# Patient Record
Sex: FEMALE | Race: WHITE | NOT HISPANIC OR LATINO | URBAN - METROPOLITAN AREA
[De-identification: names, ages, dates, MRNs, and addresses within clinical notes are randomized per-mention and may not be internally consistent; named-entity substitution may affect disease eponyms.]

---

## 2019-02-25 ENCOUNTER — EMERGENCY (EMERGENCY)
Facility: HOSPITAL | Age: 34
LOS: 1 days | Discharge: HOME | End: 2019-02-25
Attending: EMERGENCY MEDICINE
Payer: COMMERCIAL

## 2019-02-25 VITALS
HEART RATE: 66 BPM | HEIGHT: 65 IN | TEMPERATURE: 98 F | RESPIRATION RATE: 16 BRPM | WEIGHT: 149.91 LBS | DIASTOLIC BLOOD PRESSURE: 81 MMHG | SYSTOLIC BLOOD PRESSURE: 128 MMHG | OXYGEN SATURATION: 98 %

## 2019-02-25 PROCEDURE — 99284 EMERGENCY DEPT VISIT MOD MDM: CPT

## 2019-02-25 RX ORDER — KETOROLAC TROMETHAMINE 30 MG/ML
60 SYRINGE (ML) INJECTION ONCE
Qty: 0 | Refills: 0 | Status: DISCONTINUED | OUTPATIENT
Start: 2019-02-25 | End: 2019-02-25

## 2019-02-25 RX ORDER — OXYCODONE AND ACETAMINOPHEN 5; 325 MG/1; MG/1
1 TABLET ORAL ONCE
Qty: 0 | Refills: 0 | Status: DISCONTINUED | OUTPATIENT
Start: 2019-02-25 | End: 2019-02-25

## 2019-02-25 RX ORDER — ONDANSETRON 8 MG/1
4 TABLET, FILM COATED ORAL ONCE
Qty: 0 | Refills: 0 | Status: COMPLETED | OUTPATIENT
Start: 2019-02-25 | End: 2019-02-25

## 2019-02-25 RX ORDER — ONDANSETRON 8 MG/1
1 TABLET, FILM COATED ORAL
Qty: 18 | Refills: 0
Start: 2019-02-25 | End: 2019-03-02

## 2019-02-25 NOTE — ED PROVIDER NOTE - PRINCIPAL DIAGNOSIS
Non-recurrent acute suppurative otitis media of right ear without spontaneous rupture of tympanic membrane

## 2019-02-25 NOTE — ED PROVIDER NOTE - CARE PROVIDER_API CALL
Fermin José)  Otolaryngology  [address] [place] [address], [address]  [place] [address]  Phone: [phone]  Fax: [phone]  Follow Up Time:

## 2019-02-25 NOTE — ED PROVIDER NOTE - CARE PLAN
Principal Discharge DX:	Non-recurrent acute suppurative otitis media of right ear without spontaneous rupture of tympanic membrane

## 2019-02-25 NOTE — ED PROVIDER NOTE - NSFOLLOWUPINSTRUCTIONS_ED_ALL_ED_FT
Call the ENT for a follow up appointment ASAP. Ask about the possibility of myringotomy tube to help relieve the pressure.

## 2019-02-25 NOTE — ED PROVIDER NOTE - OBJECTIVE STATEMENT
Pertinent PMH/PSH/FHx/SHx and Review of Systems contained within:  33f no med hx pw right ear pain. patient notes her right ear did not "pop" when she descended from a flight today. it feels to her like it is going to "burst" and she notes decreased hearing. there is no nausea, vomiting, fever, chills, dizziness, ha, eye pain. she took tylenol and ibuprofen without relief  Fh and Sh not otherwise contributory  ROS otherwise negative

## 2019-02-26 PROCEDURE — 99284 EMERGENCY DEPT VISIT MOD MDM: CPT | Mod: 25

## 2019-02-26 PROCEDURE — 96372 THER/PROPH/DIAG INJ SC/IM: CPT

## 2019-02-26 RX ADMIN — OXYCODONE AND ACETAMINOPHEN 1 TABLET(S): 5; 325 TABLET ORAL at 00:06

## 2019-02-26 RX ADMIN — ONDANSETRON 4 MILLIGRAM(S): 8 TABLET, FILM COATED ORAL at 00:10

## 2019-02-26 RX ADMIN — Medication 60 MILLIGRAM(S): at 00:06

## 2019-02-26 RX ADMIN — Medication 1 TABLET(S): at 00:06

## 2019-02-26 RX ADMIN — OXYCODONE AND ACETAMINOPHEN 1 TABLET(S): 5; 325 TABLET ORAL at 00:12

## 2019-02-26 RX ADMIN — Medication 60 MILLIGRAM(S): at 00:12

## 2019-03-04 NOTE — ED PROVIDER NOTE - CLINICAL SUMMARY MEDICAL DECISION MAKING FREE TEXT BOX
patient pw OM and ear effusion causing failure of pressure equilibration upon descent from flight. Will start on abx and analgesic. Have recommended myringotomy tubes for patient at outpatient ent. no

## 2021-05-02 ENCOUNTER — EMERGENCY (EMERGENCY)
Facility: HOSPITAL | Age: 36
LOS: 1 days | Discharge: HOME | End: 2021-05-02
Attending: STUDENT IN AN ORGANIZED HEALTH CARE EDUCATION/TRAINING PROGRAM
Payer: COMMERCIAL

## 2021-05-02 VITALS
SYSTOLIC BLOOD PRESSURE: 127 MMHG | RESPIRATION RATE: 17 BRPM | TEMPERATURE: 99 F | WEIGHT: 149.91 LBS | HEIGHT: 65 IN | HEART RATE: 63 BPM | OXYGEN SATURATION: 100 % | DIASTOLIC BLOOD PRESSURE: 80 MMHG

## 2021-05-02 PROCEDURE — 12001 RPR S/N/AX/GEN/TRNK 2.5CM/<: CPT

## 2021-05-02 PROCEDURE — 99284 EMERGENCY DEPT VISIT MOD MDM: CPT | Mod: 25

## 2021-05-02 PROCEDURE — 99283 EMERGENCY DEPT VISIT LOW MDM: CPT | Mod: 25

## 2021-05-02 PROCEDURE — 73140 X-RAY EXAM OF FINGER(S): CPT

## 2021-05-02 PROCEDURE — 73140 X-RAY EXAM OF FINGER(S): CPT | Mod: 26,LT

## 2021-05-02 RX ORDER — CEPHALEXIN 500 MG
1 CAPSULE ORAL
Qty: 21 | Refills: 0
Start: 2021-05-02 | End: 2021-05-08

## 2021-05-02 NOTE — ED PROVIDER NOTE - ATTENDING CONTRIBUTION TO CARE
I have seen and examined the patient at bedside. I agree with the NP's plan.   Accidental laceration of left 3rd digit at level of DIP and dorsal aspect, with home knife. No nail bed or bony involvement. Superficial and clean. Neuro intact although tuft of digit not fully extending. Most likely due to swelling with no signs of extensor tendon involvement, as rest of digit extends fully. Will repair and place on hyperextended splint and hand follow up. Tetanus UTD.

## 2021-05-02 NOTE — ED PROVIDER NOTE - OBJECTIVE STATEMENT
35 y.o female with no PMhx and no PSHx presents to the ED c.o finger laceration , earlier today was cutting sweet potatoes when the knife slipped. Patient endorses minimal localized pain. Patient endorses tetanus is UTD. Patient denies any numbness tingling, or any other acute complaints. NKDA

## 2021-05-02 NOTE — ED PROCEDURE NOTE - CPROC ED POST PROC CARE GUIDE1
Verbal/written post procedure instructions were given to patient/caregiver./Instructed patient/caregiver to follow-up with primary care physician./Instructed patient/caregiver regarding signs and symptoms of infection./Keep the cast/splint/dressing clean and dry.
Verbal/written post procedure instructions were given to patient/caregiver./Instructed patient/caregiver to follow-up with primary care physician./Instructed patient/caregiver regarding signs and symptoms of infection./Elevate the injured extremity as instructed./Keep the cast/splint/dressing clean and dry.

## 2021-05-02 NOTE — ED PROVIDER NOTE - CARE PROVIDER_API CALL
Pretty John)  Plastic Surgery; Surgery  [address] [place] [address]  Phone: [phone]  Fax: [phone]  Follow Up Time:

## 2021-05-02 NOTE — ED PROVIDER NOTE - PHYSICAL EXAMINATION
L middle finger dorsal aspect of DIP with x1 cm laceration and finger passively flexd at DIP   Unable to extend at DIP, no sensory deficit   Cap refill <2 seconds

## 2021-05-02 NOTE — ED PROVIDER NOTE - NSFOLLOWUPINSTRUCTIONS_ED_ALL_ED_FT
Follow up with Dr Elias tomorrow. Call the office ([phone]) before 10 AM and explain that I spoke with Dr Elias and he wants to see you on Monday.    If you experience any new or worsening symptoms or if you are concerned you can always come back to the emergency for a re-evaluation.    For pain you can take over the counter Ibuprofen 600 mg orally every 6 hours as needed for pain. Take medication with food.

## 2021-05-02 NOTE — ED PROVIDER NOTE - PATIENT PORTAL LINK FT
You can access the FollowMyHealth Patient Portal offered by  by registering at the following website: [email or web address]. By joining [employer]’s FollowMyHealth portal, you will also be able to view your health information using other applications (apps) compatible with our system.

## 2021-05-02 NOTE — ED PROCEDURE NOTE - ATTENDING CONTRIBUTION TO CARE
Finger splint well placed on hyperextention position.
Laceration will repaired with boarders well approximated. No complications during procedure.

## 2021-05-02 NOTE — ED PROVIDER NOTE - CLINICAL SUMMARY MEDICAL DECISION MAKING FREE TEXT BOX
34 y/o female with L middle finger laceration today. Given location of laceration and physical exam, suspicion of possible extensive tendon injury, Will consult hand surgery and reassess

## 2021-05-02 NOTE — ED PROCEDURE NOTE - CPROC ED TIME OUT STATEMENT1
“Patient's name, , procedure and correct site were confirmed during the [place] Timeout.”
“Patient's name, , procedure and correct site were confirmed during the [place] Timeout.”

## 2021-05-02 NOTE — ED PROVIDER NOTE - PROGRESS NOTE DETAILS
XR negative. Suspicion of possible extensor tendon injury. Discussed with on-call hand Dr Elias. Surgeon will be able to see patient tomorrow in office. Will close lac, splint, Keflex prophylaxis. Pt is well appearing walking with steady gait, stable for discharge and follow up without fail with medical doctor. I had a detailed discussion with the patient and/or guardian regarding the historical points, exam findings, and any diagnostic results supporting the discharge diagnosis. Pt educated on care and need for follow up. Strict return instructions and red flag signs and symptoms discussed with patient. Questions answered. Pt shows understanding of discharge information and agrees to follow.

## 2021-05-06 ENCOUNTER — INPATIENT (INPATIENT)
Facility: HOSPITAL | Age: 36
LOS: 0 days | Discharge: HOME | DRG: 514 | End: 2021-05-06
Attending: SURGERY | Admitting: SURGERY
Payer: COMMERCIAL

## 2021-05-06 VITALS
SYSTOLIC BLOOD PRESSURE: 116 MMHG | DIASTOLIC BLOOD PRESSURE: 60 MMHG | RESPIRATION RATE: 16 BRPM | OXYGEN SATURATION: 100 % | HEART RATE: 63 BPM | TEMPERATURE: 98 F

## 2021-05-06 VITALS
SYSTOLIC BLOOD PRESSURE: 121 MMHG | HEIGHT: 65 IN | RESPIRATION RATE: 18 BRPM | DIASTOLIC BLOOD PRESSURE: 81 MMHG | HEART RATE: 72 BPM | TEMPERATURE: 98 F | WEIGHT: 149.69 LBS | OXYGEN SATURATION: 99 %

## 2021-05-06 DIAGNOSIS — S56.129S LACERATION OF FLEXOR MUSCLE, FASCIA AND TENDON OF UNSPECIFIED FINGER AT FOREARM LEVEL, SEQUELA: ICD-10-CM

## 2021-05-06 LAB
ALBUMIN SERPL ELPH-MCNC: 3.5 G/DL — SIGNIFICANT CHANGE UP (ref 3.5–5)
ALP SERPL-CCNC: 40 U/L — SIGNIFICANT CHANGE UP (ref 40–120)
ALT FLD-CCNC: 25 U/L DA — SIGNIFICANT CHANGE UP (ref 10–60)
ANION GAP SERPL CALC-SCNC: 7 MMOL/L — SIGNIFICANT CHANGE UP (ref 5–17)
APTT BLD: 27 SEC — LOW (ref 27.5–35.5)
AST SERPL-CCNC: 20 U/L — SIGNIFICANT CHANGE UP (ref 10–40)
BASOPHILS # BLD AUTO: 0.03 K/UL — SIGNIFICANT CHANGE UP (ref 0–0.2)
BASOPHILS NFR BLD AUTO: 0.6 % — SIGNIFICANT CHANGE UP (ref 0–2)
BILIRUB SERPL-MCNC: 0.3 MG/DL — SIGNIFICANT CHANGE UP (ref 0.2–1.2)
BLD GP AB SCN SERPL QL: SIGNIFICANT CHANGE UP
BUN SERPL-MCNC: 13 MG/DL — SIGNIFICANT CHANGE UP (ref 7–18)
CALCIUM SERPL-MCNC: 8.5 MG/DL — SIGNIFICANT CHANGE UP (ref 8.4–10.5)
CHLORIDE SERPL-SCNC: 107 MMOL/L — SIGNIFICANT CHANGE UP (ref 96–108)
CO2 SERPL-SCNC: 24 MMOL/L — SIGNIFICANT CHANGE UP (ref 22–31)
CREAT SERPL-MCNC: 0.68 MG/DL — SIGNIFICANT CHANGE UP (ref 0.5–1.3)
EOSINOPHIL # BLD AUTO: 0.04 K/UL — SIGNIFICANT CHANGE UP (ref 0–0.5)
EOSINOPHIL NFR BLD AUTO: 0.8 % — SIGNIFICANT CHANGE UP (ref 0–6)
GLUCOSE SERPL-MCNC: 92 MG/DL — SIGNIFICANT CHANGE UP (ref 70–99)
HCG SERPL-ACNC: <1 MIU/ML — SIGNIFICANT CHANGE UP
HCT VFR BLD CALC: 39.6 % — SIGNIFICANT CHANGE UP (ref 34.5–45)
HGB BLD-MCNC: 13.5 G/DL — SIGNIFICANT CHANGE UP (ref 11.5–15.5)
IMM GRANULOCYTES NFR BLD AUTO: 0.2 % — SIGNIFICANT CHANGE UP (ref 0–1.5)
INR BLD: 0.92 RATIO — SIGNIFICANT CHANGE UP (ref 0.88–1.16)
LYMPHOCYTES # BLD AUTO: 1.25 K/UL — SIGNIFICANT CHANGE UP (ref 1–3.3)
LYMPHOCYTES # BLD AUTO: 25.7 % — SIGNIFICANT CHANGE UP (ref 13–44)
MCHC RBC-ENTMCNC: 32.6 PG — SIGNIFICANT CHANGE UP (ref 27–34)
MCHC RBC-ENTMCNC: 34.1 GM/DL — SIGNIFICANT CHANGE UP (ref 32–36)
MCV RBC AUTO: 95.7 FL — SIGNIFICANT CHANGE UP (ref 80–100)
MONOCYTES # BLD AUTO: 0.35 K/UL — SIGNIFICANT CHANGE UP (ref 0–0.9)
MONOCYTES NFR BLD AUTO: 7.2 % — SIGNIFICANT CHANGE UP (ref 2–14)
NEUTROPHILS # BLD AUTO: 3.19 K/UL — SIGNIFICANT CHANGE UP (ref 1.8–7.4)
NEUTROPHILS NFR BLD AUTO: 65.5 % — SIGNIFICANT CHANGE UP (ref 43–77)
NRBC # BLD: 0 /100 WBCS — SIGNIFICANT CHANGE UP (ref 0–0)
PLATELET # BLD AUTO: 248 K/UL — SIGNIFICANT CHANGE UP (ref 150–400)
POTASSIUM SERPL-MCNC: 3.8 MMOL/L — SIGNIFICANT CHANGE UP (ref 3.5–5.3)
POTASSIUM SERPL-SCNC: 3.8 MMOL/L — SIGNIFICANT CHANGE UP (ref 3.5–5.3)
PROT SERPL-MCNC: 7 G/DL — SIGNIFICANT CHANGE UP (ref 6–8.3)
PROTHROM AB SERPL-ACNC: 11 SEC — SIGNIFICANT CHANGE UP (ref 10.6–13.6)
RBC # BLD: 4.14 M/UL — SIGNIFICANT CHANGE UP (ref 3.8–5.2)
RBC # FLD: 12.2 % — SIGNIFICANT CHANGE UP (ref 10.3–14.5)
SARS-COV-2 RNA SPEC QL NAA+PROBE: SIGNIFICANT CHANGE UP
SODIUM SERPL-SCNC: 138 MMOL/L — SIGNIFICANT CHANGE UP (ref 135–145)
WBC # BLD: 4.87 K/UL — SIGNIFICANT CHANGE UP (ref 3.8–10.5)
WBC # FLD AUTO: 4.87 K/UL — SIGNIFICANT CHANGE UP (ref 3.8–10.5)

## 2021-05-06 PROCEDURE — 86901 BLOOD TYPING SEROLOGIC RH(D): CPT

## 2021-05-06 PROCEDURE — 99291 CRITICAL CARE FIRST HOUR: CPT

## 2021-05-06 PROCEDURE — 99285 EMERGENCY DEPT VISIT HI MDM: CPT

## 2021-05-06 PROCEDURE — 87635 SARS-COV-2 COVID-19 AMP PRB: CPT

## 2021-05-06 PROCEDURE — 36415 COLL VENOUS BLD VENIPUNCTURE: CPT

## 2021-05-06 PROCEDURE — 85610 PROTHROMBIN TIME: CPT

## 2021-05-06 PROCEDURE — 85025 COMPLETE CBC W/AUTO DIFF WBC: CPT

## 2021-05-06 PROCEDURE — 86850 RBC ANTIBODY SCREEN: CPT

## 2021-05-06 PROCEDURE — 80053 COMPREHEN METABOLIC PANEL: CPT

## 2021-05-06 PROCEDURE — 85730 THROMBOPLASTIN TIME PARTIAL: CPT

## 2021-05-06 PROCEDURE — 86900 BLOOD TYPING SEROLOGIC ABO: CPT

## 2021-05-06 PROCEDURE — 84702 CHORIONIC GONADOTROPIN TEST: CPT

## 2021-05-06 PROCEDURE — C1713: CPT

## 2021-05-06 RX ORDER — SODIUM CHLORIDE 9 MG/ML
1000 INJECTION, SOLUTION INTRAVENOUS
Refills: 0 | Status: DISCONTINUED | OUTPATIENT
Start: 2021-05-06 | End: 2021-05-06

## 2021-05-06 RX ORDER — ACETAMINOPHEN 500 MG
1000 TABLET ORAL ONCE
Refills: 0 | Status: DISCONTINUED | OUTPATIENT
Start: 2021-05-06 | End: 2021-05-06

## 2021-05-06 RX ORDER — OXYCODONE HYDROCHLORIDE 5 MG/1
1 TABLET ORAL
Qty: 6 | Refills: 0
Start: 2021-05-06 | End: 2021-05-07

## 2021-05-06 RX ORDER — ONDANSETRON 8 MG/1
4 TABLET, FILM COATED ORAL EVERY 6 HOURS
Refills: 0 | Status: DISCONTINUED | OUTPATIENT
Start: 2021-05-06 | End: 2021-05-06

## 2021-05-06 NOTE — ED ADULT NURSE NOTE - ED STAT RN HANDOFF DETAILS
Report given to RIKA BRYANT. No acute distress noted, denies chest pain, no shortness of breath indicated.

## 2021-05-06 NOTE — DISCHARGE NOTE NURSING/CASE MANAGEMENT/SOCIAL WORK - PATIENT PORTAL LINK FT
You can access the FollowMyHealth Patient Portal offered by [facility] by registering at the following website: [email or web address]. By joining [employer]’s FollowMyHealth portal, you will also be able to view your health information using other applications (apps) compatible with our system.

## 2021-05-06 NOTE — BRIEF OPERATIVE NOTE - NSICDXBRIEFPOSTOP_GEN_ALL_CORE_FT
POST-OP DIAGNOSIS:  Extensor tendon laceration of left hand with open wound 06-May-2021 16:58:05  Susan Howard

## 2021-05-06 NOTE — H&P ADULT - HISTORY OF PRESENT ILLNESS
35F with pmhx endometriosis s/p diagnostic laparoscopy (on OCPs) presents to ED sent from Dr. John's office with 3rd digit extensor tendon injury; Pt reports she was cutting potatoes 2 days ago, cut the 3rd digit and came to the ED and had the laceration sutured. Pt was placed on Cefalexin and has been taking it as directed (last dose taken this morning). Pt was seen at Dr. John's office and was told she has a 3rd digit extensor tendon injury in need of repair. Denies fever, chills at home or any other complaints. Has not eaten anything since last night.

## 2021-05-06 NOTE — BRIEF OPERATIVE NOTE - NSICDXBRIEFPROCEDURE_GEN_ALL_CORE_FT
PROCEDURES:  Extensor tendon repair of finger 06-May-2021 16:56:31  Susan Howard  Insertion of Steinmann pin or Anahi wire into finger 06-May-2021 16:57:16  Susan Howard

## 2021-05-06 NOTE — DISCHARGE NOTE PROVIDER - NSDCCPTREATMENT_GEN_ALL_CORE_FT
PRINCIPAL PROCEDURE  Procedure: Repair, extensor tendon, finger  Findings and Treatment: left, third digit

## 2021-05-06 NOTE — BRIEF OPERATIVE NOTE - OPERATION/FINDINGS
L middle finger extensor tendon laceration, repaired and slightly hyperflexed and secured with K-wire

## 2021-05-06 NOTE — H&P ADULT - ASSESSMENT
35F with left third digit extensor tendon injury    - OR today with Dr. John for exploration of 3rd digit with repair of extensor tendon  - NPO/IVF  - COVID negative  - Consent to be obtained

## 2021-05-06 NOTE — ED ADULT NURSE NOTE - OBJECTIVE STATEMENT
Pt sent for OR today for tendon repair of the left hand 3rd finger. No acute distress noted, denies chest pain, no shortness of breath indicated.

## 2021-05-06 NOTE — ED PROVIDER NOTE - ATTENDING CONTRIBUTION TO CARE
Nemes - 34yo F with no PMHx presenting to ED with complaints of L hand tendon rupture, patient was seen by Dr. John in his office two days ago, told to return to ER today to be added to the OR schedule. S/p laceration w a knife to dorsal aspect of the L middle finger over the DIP joint. Nemes - 36yo F with no PMHx presenting to ED with complaints of L hand tendon rupture, patient was seen by Dr. John in his office two days ago, told to return to ER today to be added to the OR schedule. S/p laceration w a knife to dorsal aspect of the L middle finger over the DIP joint. Also, mallet finger on exam, unable to extend distal phalanx. Vascular intact. Will admit for OR repair.

## 2021-05-06 NOTE — H&P ADULT - NSHPLABSRESULTS_GEN_ALL_CORE
LABS:                      13.5   4.87  )-----------( 248      ( 06 May 2021 11:52 )             39.6   05-06    138  |  107  |  13  ----------------------------<  92  3.8   |  24  |  x     Ca    8.5      06 May 2021 11:52    TPro  x   /  Alb  3.5  /  TBili  x   /  DBili  x   /  AST  x   /  ALT  x   /  AlkPhos  x   05-06    COVID-19 PCR . (05.06.21 @ 11:52)    COVID-19 PCR: NotDetec: You can help in the fight against COVID-19. [employer] may contact  you to see if you are interested in voluntarily participating in one of  our clinical trials.  This test has been validated by [employer] to be accurate;  though it has not been FDA cleared/approved by the usual pathway  As with all laboratory test, results should be correlated with clinical  findings.  https://www.fda.gov/media/630534/download  https://www.fda.gov/media/442387/download  EUA/IVD    Activated Partial Thromboplastin Time in AM (05.06.21 @ 11:52)    Activated Partial Thromboplastin Time: 27.0 sec  Prothrombin Time and INR, Plasma in AM (05.06.21 @ 11:52)    Prothrombin Time, Plasma: 11.0 sec    INR: 0.92: Recommended ranges for therapeutic INR:    2.0-3.0 for most medical and surgical thromboembolic states    2.0-3.0 for atrial fibrillation    2.0-3.0 for bileaflet mechanical valve in aortic position    2.5-3.5 for mechanical heart valves    Chest 2004;126:[id number]  The presence of direct thrombin inhibitors (argatroban, refludan)  may falsely increase results. ratio

## 2021-05-06 NOTE — ED PROVIDER NOTE - CLINICAL SUMMARY MEDICAL DECISION MAKING FREE TEXT BOX
Based on exam and history patient to be admitted for tendon repair, will speak with Dr. John, Surgical PA and admit patient.

## 2021-05-06 NOTE — DISCHARGE NOTE PROVIDER - NSDCCPCAREPLAN_GEN_ALL_CORE_FT
PRINCIPAL DISCHARGE DIAGNOSIS  Diagnosis: Flexor tendon laceration of finger with open wound, sequela  Assessment and Plan of Treatment: Please continue your prescribed antibiotics as directed.  Please follow-up with Dr. John in the office in 1-2 weeks: call the office to make an appointment.       PRINCIPAL DISCHARGE DIAGNOSIS  Diagnosis: Flexor tendon laceration of finger with open wound, sequela  Assessment and Plan of Treatment: Please continue your prescribed antibiotics as directed.  Please follow-up with Dr. John in the office in 1-2 weeks: call the office to make an appointment.  You may take over the counter 500mg - 1000mg Tylenol every 6 hours for your pain. You may take your prescribed oxycodone 5mg for any severe breakthrough pain not relieved by Tylenol. Do not drive or make important decisions while on this medication.   Please do not take NSAIDs for the next week: including Aleeve, Ibuprofen, Advil, or Motrin

## 2021-05-06 NOTE — ED PROVIDER NOTE - OBJECTIVE STATEMENT
36 yo female with no PMHx presenting to ED with complaints of left hand tendon rupture, patient was seen by Dr. John in his office two days ago, told to return to ER today to be added to the OR schedule. Patient has no complaints of pain, fever.

## 2021-05-06 NOTE — DISCHARGE NOTE PROVIDER - CARE PROVIDER_API CALL
Pretty John)  Plastic Surgery; Surgery  [address] [place] [address]  Phone: [phone]  Fax: [phone]  Follow Up Time:

## 2021-05-06 NOTE — BRIEF OPERATIVE NOTE - NSICDXBRIEFPREOP_GEN_ALL_CORE_FT
PRE-OP DIAGNOSIS:  Extensor tendon laceration of left hand with open wound 06-May-2021 16:58:01  Susan Howard

## 2021-05-06 NOTE — H&P ADULT - NSHPPHYSICALEXAM_GEN_ALL_CORE
General: awake, alert, NAD  Respiratory: non-labored breathing  Extremities: warm and well perfused, +radial pulse; left hand 3rd digit absent extension from DIP; flexion limited 2/2 pain; good cap refill; sutures in place to dorsum of hand on DIP joint

## 2021-05-06 NOTE — DISCHARGE NOTE PROVIDER - NSDCMRMEDTOKEN_GEN_ALL_CORE_FT
cephalexin 500 mg oral tablet: 1 tab(s) orally 3 times a day   oxyCODONE 5 mg oral tablet: 1 tab(s) orally every 6 hours MDD:4

## 2021-09-21 NOTE — ED PROVIDER NOTE - CONSTITUTIONAL, MLM
Symptoms were burning with urination  normal... Well appearing, awake, alert, oriented to person, place, time/situation and in no apparent distress.

## 2021-11-17 NOTE — ED ADULT NURSE NOTE - OBJECTIVE STATEMENT
Detail Level: Detailed AOX4 +ambulatory patient reports slicing potatoes x 1 hour ago and accidentally sliced her L 2nd and 3rd finger. +pulses

## 2022-05-12 NOTE — ED PROVIDER NOTE - CROS ED ROS STATEMENT
Patient notified and indicated understanding.  Pt asked if you could order drinks called Serafin by ensure to her pharmacy.  She has been supplied by wound care until recently. all other ROS negative except as per HPI

## 2023-10-17 NOTE — PACU DISCHARGE NOTE - AIRWAY PATENCY:
Problem: Discharge Planning  Goal: Discharge to home or other facility with appropriate resources  Outcome: Progressing  Flowsheets (Taken 10/17/2023 0147 by Иван Ayala RN)  Discharge to home or other facility with appropriate resources:   Identify barriers to discharge with patient and caregiver   Identify discharge learning needs (meds, wound care, etc)   Refer to discharge planning if patient needs post-hospital services based on physician order or complex needs related to functional status, cognitive ability or social support system   Arrange for needed discharge resources and transportation as appropriate   Arrange for interpreters to assist at discharge as needed     Problem: Safety - Adult  Goal: Free from fall injury  Outcome: Progressing     Problem: ABCDS Injury Assessment  Goal: Absence of physical injury  Outcome: Progressing     Problem: Skin/Tissue Integrity  Goal: Absence of new skin breakdown  Description: 1. Monitor for areas of redness and/or skin breakdown  2. Assess vascular access sites hourly  3. Every 4-6 hours minimum:  Change oxygen saturation probe site  4. Every 4-6 hours:  If on nasal continuous positive airway pressure, respiratory therapy assess nares and determine need for appliance change or resting period.   Outcome: Progressing Satisfactory
